# Patient Record
Sex: MALE | Race: WHITE | NOT HISPANIC OR LATINO | Employment: STUDENT | URBAN - METROPOLITAN AREA
[De-identification: names, ages, dates, MRNs, and addresses within clinical notes are randomized per-mention and may not be internally consistent; named-entity substitution may affect disease eponyms.]

---

## 2018-06-09 ENCOUNTER — OFFICE VISIT (OUTPATIENT)
Dept: OPHTHALMOLOGY | Facility: CLINIC | Age: 23
End: 2018-06-09
Payer: OTHER GOVERNMENT

## 2018-06-09 DIAGNOSIS — H52.13 MYOPIA, BILATERAL: Primary | ICD-10-CM

## 2018-06-09 PROCEDURE — 92015 DETERMINE REFRACTIVE STATE: CPT | Mod: ,,, | Performed by: OPTOMETRIST

## 2018-06-09 PROCEDURE — 99201 *HC E&M-NEW PATIENT-LVL I: CPT | Mod: PBBFAC,PO | Performed by: OPTOMETRIST

## 2018-06-09 PROCEDURE — 99999 PR PBB SHADOW E&M-NEW PATIENT-LVL I: CPT | Mod: PBBFAC,,, | Performed by: OPTOMETRIST

## 2018-06-09 PROCEDURE — 92310 CONTACT LENS FITTING OU: CPT | Mod: ,,, | Performed by: OPTOMETRIST

## 2018-06-09 PROCEDURE — 92004 COMPRE OPH EXAM NEW PT 1/>: CPT | Mod: S$PBB,,, | Performed by: OPTOMETRIST

## 2018-06-09 NOTE — PROGRESS NOTES
HPI     PTs last exam was 1 year ago. PT c/o blurred distance va and wears cls   full time.   HPI    Any vision changes since last exam: no  Eye pain: no  Other ocular symptoms: no    Do you wear currently wear glasses or contacts? both    Interested in contacts today? yes    Do you plan on getting new glasses today? If needed      Last edited by Demetria Stewart MA on 6/9/2018 11:01 AM. (History)            Assessment /Plan     For exam results, see Encounter Report.    Myopia, bilateral      Eyeglass Final Rx     Eyeglass Final Rx       Sphere    Right -4.50    Left -4.75    Expiration Date:  6/10/2019              Contact Lens Prescription (6/9/2018)        Brand Base Curve Diameter Sphere    Right Acuvue Shannon 8.4 14.0 -4.75    Left Acuvue Shannon 8.4 14.0 -4.75    Expiration Date:  6/10/2019    Replacement:  Monthly    Wearing Schedule:  Daily wear        Dispensed trial contact lenses today. Patient is to wear lenses for 1 week. Ok to order supply if no problems. RTC PRN if any problems arise.  RTC 1 yr for dilated eye exam or PRN if any problems.   Discussed above and answered questions.

## 2018-12-10 ENCOUNTER — OFFICE VISIT (OUTPATIENT)
Dept: INTERNAL MEDICINE | Facility: CLINIC | Age: 23
End: 2018-12-10
Payer: OTHER GOVERNMENT

## 2018-12-10 VITALS
WEIGHT: 176.56 LBS | TEMPERATURE: 99 F | BODY MASS INDEX: 28.38 KG/M2 | OXYGEN SATURATION: 98 % | DIASTOLIC BLOOD PRESSURE: 78 MMHG | SYSTOLIC BLOOD PRESSURE: 122 MMHG | HEIGHT: 66 IN | HEART RATE: 104 BPM

## 2018-12-10 DIAGNOSIS — L81.9 BLEEDING PIGMENTED SKIN LESION: Primary | ICD-10-CM

## 2018-12-10 PROCEDURE — 99203 OFFICE O/P NEW LOW 30 MIN: CPT | Mod: S$PBB,,, | Performed by: FAMILY MEDICINE

## 2018-12-10 PROCEDURE — 99999 PR PBB SHADOW E&M-EST. PATIENT-LVL IV: CPT | Mod: PBBFAC,,, | Performed by: FAMILY MEDICINE

## 2018-12-10 PROCEDURE — 99214 OFFICE O/P EST MOD 30 MIN: CPT | Mod: PBBFAC,PO | Performed by: FAMILY MEDICINE

## 2018-12-10 NOTE — PROGRESS NOTES
Subjective:   Patient ID:  Erich Arias is a 23 y.o. male.    Chief Complaint:  Ingrown hair    Past Medical History:   Diagnosis Date    Allergy      History reviewed. No pertinent surgical history.  Family History   Problem Relation Age of Onset    Hypertension Maternal Grandmother     Diabetes Maternal Grandmother     Heart disease Maternal Grandfather     Diabetes Paternal Grandfather     Myasthenia gravis Paternal Grandfather     Breast cancer Unknown     Melanoma Unknown         grandparent     Review of patient's allergies indicates:  No Known Allergies    Current Outpatient Medications:     cetirizine (ZYRTEC) 10 MG tablet, Take 10 mg by mouth daily as needed. , Disp: , Rfl:        Patient presents for evaluation of possible ingrown hair/cyst.    Has angioma birthmark right lateral hip.  Denies any other significant skin lesions.    Family history melanoma.    Reports no pain.  No fever.  Greater than 1 year present, but over past month increased bleeding.  Denies any pustular or sebum type discharge.      Mass   This is a chronic problem. The current episode started more than 1 year ago. The problem occurs constantly. The problem has been unchanged. Pertinent negatives include no abdominal pain, anorexia, arthralgias, change in bowel habit, chest pain, chills, congestion, coughing, diaphoresis, fatigue, fever, headaches, joint swelling, myalgias, nausea, neck pain, numbness, rash, sore throat, swollen glands, urinary symptoms, vertigo, visual change, vomiting or weakness. Nothing aggravates the symptoms. He has tried nothing for the symptoms.       Review of Systems   Constitutional: Negative for activity change, chills, diaphoresis, fatigue, fever and unexpected weight change.   HENT: Negative for congestion, hearing loss, rhinorrhea, sore throat and trouble swallowing.    Eyes: Negative for discharge and visual disturbance.   Respiratory: Negative for cough, chest tightness and wheezing.   "  Cardiovascular: Negative for chest pain and palpitations.   Gastrointestinal: Negative for abdominal pain, anorexia, blood in stool, change in bowel habit, constipation, diarrhea, nausea and vomiting.   Endocrine: Negative for polydipsia and polyuria.   Genitourinary: Negative for difficulty urinating, hematuria and urgency.   Musculoskeletal: Negative for arthralgias, joint swelling, myalgias and neck pain.   Skin: Negative for rash.   Neurological: Negative for vertigo, weakness, numbness and headaches.   Psychiatric/Behavioral: Negative for confusion and dysphoric mood.       Objective:   /78 (BP Location: Right arm, Patient Position: Sitting, BP Method: Medium (Manual))   Pulse 104   Temp 99.1 °F (37.3 °C) (Tympanic)   Ht 5' 6" (1.676 m)   Wt 80.1 kg (176 lb 9.4 oz)   SpO2 98%   BMI 28.50 kg/m²     Physical Exam   Constitutional: Vital signs are normal. He appears well-developed and well-nourished. No distress.   Cardiovascular: Normal rate and regular rhythm.   Pulmonary/Chest: Effort normal. No respiratory distress.   Musculoskeletal: He exhibits no edema.   Skin: Skin is warm, dry and intact. Lesion noted. No rash noted.   Area left lateral upper buttock, not in or over spine, with purplish discoloration, elevation, and irritation.    Friable but not actively bleeding.    Nontender.    No warmth.    No underlying palpable cyst, or mass.    Unable to express any material from lesion/area.   Psychiatric: He has a normal mood and affect.   Nursing note and vitals reviewed.    Assessment:     1. Bleeding pigmented skin lesion      Plan:   Bleeding pigmented skin lesion  -     Ambulatory referral to Dermatology    Questionable etiology.    Probable cherry/berry hemangioma based.  Based on recent increase in size, irritation, and bleeding referral to Dermatology for additional evaluation and/or removal.  Patient expresses understanding agrees the above plan.    Follow-up for annual physical exam.    "

## 2019-01-08 ENCOUNTER — INITIAL CONSULT (OUTPATIENT)
Dept: DERMATOLOGY | Facility: CLINIC | Age: 24
End: 2019-01-08
Payer: OTHER GOVERNMENT

## 2019-01-08 DIAGNOSIS — D48.5 NEOPLASM OF UNCERTAIN BEHAVIOR OF SKIN: Primary | ICD-10-CM

## 2019-01-08 PROCEDURE — 88305 TISSUE EXAM BY PATHOLOGIST: CPT | Performed by: PATHOLOGY

## 2019-01-08 PROCEDURE — 99213 OFFICE O/P EST LOW 20 MIN: CPT | Mod: PBBFAC,PO,25 | Performed by: STUDENT IN AN ORGANIZED HEALTH CARE EDUCATION/TRAINING PROGRAM

## 2019-01-08 PROCEDURE — 88305 TISSUE EXAM BY PATHOLOGIST: CPT | Mod: 26,,, | Performed by: PATHOLOGY

## 2019-01-08 PROCEDURE — 99201 PR OFFICE/OUTPT VISIT,NEW,LEVL I: CPT | Mod: 25,S$PBB,, | Performed by: STUDENT IN AN ORGANIZED HEALTH CARE EDUCATION/TRAINING PROGRAM

## 2019-01-08 PROCEDURE — 99201 PR OFFICE/OUTPT VISIT,NEW,LEVL I: ICD-10-PCS | Mod: 25,S$PBB,, | Performed by: STUDENT IN AN ORGANIZED HEALTH CARE EDUCATION/TRAINING PROGRAM

## 2019-01-08 PROCEDURE — 11102 TANGNTL BX SKIN SINGLE LES: CPT | Mod: PBBFAC,PO | Performed by: STUDENT IN AN ORGANIZED HEALTH CARE EDUCATION/TRAINING PROGRAM

## 2019-01-08 PROCEDURE — 99999 PR PBB SHADOW E&M-EST. PATIENT-LVL III: ICD-10-PCS | Mod: PBBFAC,,, | Performed by: STUDENT IN AN ORGANIZED HEALTH CARE EDUCATION/TRAINING PROGRAM

## 2019-01-08 PROCEDURE — 99999 PR PBB SHADOW E&M-EST. PATIENT-LVL III: CPT | Mod: PBBFAC,,, | Performed by: STUDENT IN AN ORGANIZED HEALTH CARE EDUCATION/TRAINING PROGRAM

## 2019-01-08 PROCEDURE — 88305 TISSUE SPECIMEN TO PATHOLOGY, DERMATOLOGY: ICD-10-PCS | Mod: 26,,, | Performed by: PATHOLOGY

## 2019-01-08 PROCEDURE — 11102 TANGNTL BX SKIN SINGLE LES: CPT | Mod: S$PBB,,, | Performed by: STUDENT IN AN ORGANIZED HEALTH CARE EDUCATION/TRAINING PROGRAM

## 2019-01-08 PROCEDURE — 11102 PR TANGENTIAL BIOPSY, SKIN, SINGLE LESION: ICD-10-PCS | Mod: S$PBB,,, | Performed by: STUDENT IN AN ORGANIZED HEALTH CARE EDUCATION/TRAINING PROGRAM

## 2019-01-08 NOTE — PROGRESS NOTES
Subjective:       Patient ID:  Erich Arias is a 23 y.o. male who presents for   Chief Complaint   Patient presents with    Spot     c/o spot to lower back since july that bleeds on and off no tx      History of Present Illness: The patient presents with chief complaint of a skin lesion.   Location: buttock  Duration:  Since July   Signs/Symptoms: bleeds on and off with mild trauma     Prior treatments: none  Pt denies any history of skin cancer           Review of Systems   Skin: Negative for itching and rash.        Objective:    Physical Exam   Constitutional: He appears well-developed and well-nourished. No distress.   Neurological: He is alert and oriented to person, place, and time. He is not disoriented.   Psychiatric: He has a normal mood and affect.   Skin:   Areas Examined (abnormalities noted in diagram):   Head / Face Inspection Performed  Genitals / Buttocks / Groin Inspection Performed              Diagram Legend     Erythematous scaling macule/papule c/w actinic keratosis       Vascular papule c/w angioma      Pigmented verrucoid papule/plaque c/w seborrheic keratosis      Yellow umbilicated papule c/w sebaceous hyperplasia      Irregularly shaped tan macule c/w lentigo     1-2 mm smooth white papules consistent with Milia      Movable subcutaneous cyst with punctum c/w epidermal inclusion cyst      Subcutaneous movable cyst c/w pilar cyst      Firm pink to brown papule c/w dermatofibroma      Pedunculated fleshy papule(s) c/w skin tag(s)      Evenly pigmented macule c/w junctional nevus     Mildly variegated pigmented, slightly irregular-bordered macule c/w mildly atypical nevus      Flesh colored to evenly pigmented papule c/w intradermal nevus       Pink pearly papule/plaque c/w basal cell carcinoma      Erythematous hyperkeratotic cursted plaque c/w SCC      Surgical scar with no sign of skin cancer recurrence      Open and closed comedones      Inflammatory papules and pustules      Verrucoid  papule consistent consistent with wart     Erythematous eczematous patches and plaques     Dystrophic onycholytic nail with subungual debris c/w onychomycosis     Umbilicated papule    Erythematous-base heme-crusted tan verrucoid plaque consistent with inflamed seborrheic keratosis     Erythematous Silvery Scaling Plaque c/w Psoriasis     See annotation          Assessment / Plan:      Pathology Orders:     Normal Orders This Visit    Tissue Specimen To Pathology, Dermatology     Questions:    Directional Terms:  Other(comment)    Clinical information:  r/o pyogenic granuloma vs other    Specific Site:  buttock        Neoplasm of uncertain behavior of skin  -     Tissue Specimen To Pathology, Dermatology    Shave biopsy procedure note:    Shave biopsy performed after verbal consent including risk of infection, scar, recurrence, need for additional treatment of site. Area prepped with alcohol, anesthetized with approximately 1.0cc of 1% lidocaine with epinephrine. Lesional tissue shaved with razor blade. Hemostasis achieved with application of aluminum chloride followed by hyfrecation. No complications. Dressing applied. Wound care explained.         Follow-up if symptoms worsen or fail to improve.

## 2019-01-08 NOTE — LETTER
January 8, 2019      Bert Brunner MD  [address] [place] [address]           [facility]  [address] [place] [address]  Phone: [phone]  Fax: [phone]          Patient: Erich Arias   MR Number: [id number]   YOB: 1995   Date of Visit: 1/8/2019       Dear Dr. Bert Brunner:    Thank you for referring Erich Arias to me for evaluation. Attached you will find relevant portions of my assessment and plan of care.    If you have questions, please do not hesitate to call me. I look forward to following Erich Arias along with you.    Sincerely,    Danny Cerda MD    Enclosure  CC:  No Recipients    If you would like to receive this communication electronically, please contact [email or web address] or [phone] to request more information on [facility] access.    For providers and/or their staff who would like to refer a patient to [facility], please contact us through our one-stop-shop provider referral line, [facility], at [phone].    If you feel you have received this communication in error or would no longer like to receive these types of communications, please e-mail [email or web address]

## 2019-05-09 ENCOUNTER — OFFICE VISIT (OUTPATIENT)
Dept: DERMATOLOGY | Facility: CLINIC | Age: 24
End: 2019-05-09
Payer: OTHER GOVERNMENT

## 2019-05-09 DIAGNOSIS — D48.5 NEOPLASM OF UNCERTAIN BEHAVIOR OF SKIN: Primary | ICD-10-CM

## 2019-05-09 PROCEDURE — 99213 OFFICE O/P EST LOW 20 MIN: CPT | Mod: S$PBB,,, | Performed by: STUDENT IN AN ORGANIZED HEALTH CARE EDUCATION/TRAINING PROGRAM

## 2019-05-09 PROCEDURE — 99999 PR PBB SHADOW E&M-EST. PATIENT-LVL II: CPT | Mod: PBBFAC,,, | Performed by: STUDENT IN AN ORGANIZED HEALTH CARE EDUCATION/TRAINING PROGRAM

## 2019-05-09 PROCEDURE — 99213 PR OFFICE/OUTPT VISIT, EST, LEVL III, 20-29 MIN: ICD-10-PCS | Mod: S$PBB,,, | Performed by: STUDENT IN AN ORGANIZED HEALTH CARE EDUCATION/TRAINING PROGRAM

## 2019-05-09 PROCEDURE — 99999 PR PBB SHADOW E&M-EST. PATIENT-LVL II: ICD-10-PCS | Mod: PBBFAC,,, | Performed by: STUDENT IN AN ORGANIZED HEALTH CARE EDUCATION/TRAINING PROGRAM

## 2019-05-09 PROCEDURE — 88305 TISSUE SPECIMEN TO PATHOLOGY, DERMATOLOGY: ICD-10-PCS | Mod: 26,,, | Performed by: PATHOLOGY

## 2019-05-09 PROCEDURE — 88305 TISSUE EXAM BY PATHOLOGIST: CPT | Mod: 26,,, | Performed by: PATHOLOGY

## 2019-05-09 PROCEDURE — 88305 TISSUE EXAM BY PATHOLOGIST: CPT | Performed by: PATHOLOGY

## 2019-05-09 PROCEDURE — 99212 OFFICE O/P EST SF 10 MIN: CPT | Mod: PBBFAC,25 | Performed by: STUDENT IN AN ORGANIZED HEALTH CARE EDUCATION/TRAINING PROGRAM

## 2019-05-09 NOTE — PATIENT INSTRUCTIONS

## 2019-05-09 NOTE — PROGRESS NOTES
Subjective:       Patient ID:  Erich Arias is a 23 y.o. male who presents for   Chief Complaint   Patient presents with    Spot     f/u spot to lower back that is bleeding again tx hyfrecator      History of Present Illness: The patient presents with chief complaint of recurrence of a pyogenic granuloma. He was last seen on 1/8/19 where he had a bleeding lesion on the buttock treated with shaving and electrocautery. He reports that the lesion had resolved until several weeks ago, when he noticed a growth in the area and noticed bleeding that was similar to the past lesion. Denies any other concerning rash or skin lesions.       Review of Systems   Skin: Negative for itching, rash and dry skin.        Objective:    Physical Exam   Constitutional: He appears well-developed and well-nourished. No distress.   Neurological: He is alert and oriented to person, place, and time. He is not disoriented.   Psychiatric: He has a normal mood and affect.   Skin:   Areas Examined (abnormalities noted in diagram):   Head / Face Inspection Performed  Genitals / Buttocks / Groin Inspection Performed              Diagram Legend     Erythematous scaling macule/papule c/w actinic keratosis       Vascular papule c/w angioma      Pigmented verrucoid papule/plaque c/w seborrheic keratosis      Yellow umbilicated papule c/w sebaceous hyperplasia      Irregularly shaped tan macule c/w lentigo     1-2 mm smooth white papules consistent with Milia      Movable subcutaneous cyst with punctum c/w epidermal inclusion cyst      Subcutaneous movable cyst c/w pilar cyst      Firm pink to brown papule c/w dermatofibroma      Pedunculated fleshy papule(s) c/w skin tag(s)      Evenly pigmented macule c/w junctional nevus     Mildly variegated pigmented, slightly irregular-bordered macule c/w mildly atypical nevus      Flesh colored to evenly pigmented papule c/w intradermal nevus       Pink pearly papule/plaque c/w basal cell carcinoma       Erythematous hyperkeratotic cursted plaque c/w SCC      Surgical scar with no sign of skin cancer recurrence      Open and closed comedones      Inflammatory papules and pustules      Verrucoid papule consistent consistent with wart     Erythematous eczematous patches and plaques     Dystrophic onycholytic nail with subungual debris c/w onychomycosis     Umbilicated papule    Erythematous-base heme-crusted tan verrucoid plaque consistent with inflamed seborrheic keratosis     Erythematous Silvery Scaling Plaque c/w Psoriasis     See annotation      Assessment / Plan:      Pathology Orders:     Normal Orders This Visit    Tissue Specimen To Pathology, Dermatology     Questions:    Directional Terms:  Other(comment)    Clinical Information:  Previous biopsy proven pyogenic granuloma with recurrence    Specific Site:  left medial buttocks        Neoplasm of uncertain behavior of skin  -     Tissue Specimen To Pathology, Dermatology    Discussed treatment option with patient including repeat shave vs excision. Patient would like to proceed with shave and will schedule for surgical excision (pt is dealing with insurance changes and will notify clinic once insurance is finalized).     Shave biopsy procedure note:    Shave biopsy performed after verbal consent including risk of infection, scar, recurrence, need for additional treatment of site. Area prepped with alcohol, anesthetized with approximately 1.0cc of 1% lidocaine with epinephrine. Lesional tissue shaved with razor blade. Hemostasis achieved with application of aluminum chloride followed by hyfrecation. No complications. Dressing applied. Wound care explained.             Follow up if symptoms worsen or fail to improve.